# Patient Record
Sex: FEMALE | Race: WHITE | NOT HISPANIC OR LATINO | Employment: OTHER | URBAN - METROPOLITAN AREA
[De-identification: names, ages, dates, MRNs, and addresses within clinical notes are randomized per-mention and may not be internally consistent; named-entity substitution may affect disease eponyms.]

---

## 2023-11-03 ENCOUNTER — TELEPHONE (OUTPATIENT)
Dept: OTOLARYNGOLOGY | Facility: HOSPITAL | Age: 58
End: 2023-11-03
Payer: COMMERCIAL

## 2023-11-06 ENCOUNTER — NUTRITION (OUTPATIENT)
Dept: HEMATOLOGY/ONCOLOGY | Facility: HOSPITAL | Age: 58
End: 2023-11-06
Payer: COMMERCIAL
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
## 2023-12-05 ENCOUNTER — OFFICE VISIT (OUTPATIENT)
Dept: OTOLARYNGOLOGY | Facility: CLINIC | Age: 58
End: 2023-12-05
Payer: COMMERCIAL

## 2023-12-05 VITALS — TEMPERATURE: 97.8 F | BODY MASS INDEX: 20.99 KG/M2 | WEIGHT: 126 LBS | HEIGHT: 65 IN

## 2023-12-05 DIAGNOSIS — R13.12 OROPHARYNGEAL DYSPHAGIA: ICD-10-CM

## 2023-12-05 DIAGNOSIS — C32.9 MALIGNANT NEOPLASM OF LARYNX (MULTI): Primary | ICD-10-CM

## 2023-12-05 DIAGNOSIS — R49.1 APHONIA: ICD-10-CM

## 2023-12-05 PROCEDURE — 99214 OFFICE O/P EST MOD 30 MIN: CPT | Performed by: OTOLARYNGOLOGY

## 2023-12-05 PROCEDURE — 1036F TOBACCO NON-USER: CPT | Performed by: OTOLARYNGOLOGY

## 2023-12-05 PROCEDURE — 92511 NASOPHARYNGOSCOPY: CPT | Performed by: OTOLARYNGOLOGY

## 2023-12-05 RX ORDER — ACETAMINOPHEN 500 MG
TABLET ORAL EVERY 6 HOURS PRN
COMMUNITY
End: 2024-04-02 | Stop reason: SDUPTHER

## 2023-12-05 RX ORDER — ONDANSETRON 8 MG/1
8 TABLET, ORALLY DISINTEGRATING ORAL EVERY 8 HOURS PRN
COMMUNITY
End: 2024-04-02 | Stop reason: DRUGHIGH

## 2023-12-05 RX ORDER — CYCLOBENZAPRINE HCL 10 MG
10 TABLET ORAL 3 TIMES DAILY PRN
COMMUNITY

## 2023-12-05 RX ORDER — OXYCODONE HYDROCHLORIDE 5 MG/1
5 TABLET ORAL EVERY 6 HOURS PRN
COMMUNITY

## 2023-12-05 RX ORDER — POLYETHYLENE GLYCOL 3350 17 G/17G
17 POWDER, FOR SOLUTION ORAL DAILY
COMMUNITY

## 2023-12-05 RX ORDER — SENNOSIDES 8.6 MG/1
1 TABLET ORAL DAILY
COMMUNITY
End: 2024-05-14 | Stop reason: SDUPTHER

## 2023-12-05 RX ORDER — LANSOPRAZOLE 30 MG/1
30 CAPSULE, DELAYED RELEASE ORAL
COMMUNITY
End: 2024-04-02 | Stop reason: SDUPTHER

## 2023-12-05 ASSESSMENT — PATIENT HEALTH QUESTIONNAIRE - PHQ9
2. FEELING DOWN, DEPRESSED OR HOPELESS: NOT AT ALL
SUM OF ALL RESPONSES TO PHQ9 QUESTIONS 1 AND 2: 0
1. LITTLE INTEREST OR PLEASURE IN DOING THINGS: NOT AT ALL

## 2023-12-17 PROBLEM — R49.1 APHONIA: Status: ACTIVE | Noted: 2023-12-17

## 2023-12-17 PROBLEM — C32.9 MALIGNANT NEOPLASM OF LARYNX (MULTI): Status: ACTIVE | Noted: 2023-12-17

## 2023-12-17 PROBLEM — R13.12 OROPHARYNGEAL DYSPHAGIA: Status: ACTIVE | Noted: 2023-12-17

## 2023-12-18 ENCOUNTER — EVALUATION (OUTPATIENT)
Dept: SPEECH THERAPY | Facility: HOSPITAL | Age: 58
End: 2023-12-18
Payer: COMMERCIAL

## 2023-12-18 DIAGNOSIS — R13.12 OROPHARYNGEAL DYSPHAGIA: Primary | ICD-10-CM

## 2023-12-18 DIAGNOSIS — R49.1 APHONIA: ICD-10-CM

## 2023-12-18 DIAGNOSIS — C32.9 MALIGNANT NEOPLASM OF LARYNX (MULTI): ICD-10-CM

## 2023-12-18 PROCEDURE — 92607 EX FOR SPEECH DEVICE RX 1HR: CPT | Mod: GN | Performed by: SPEECH-LANGUAGE PATHOLOGIST

## 2023-12-18 ASSESSMENT — PAIN - FUNCTIONAL ASSESSMENT: PAIN_FUNCTIONAL_ASSESSMENT: 0-10

## 2023-12-18 ASSESSMENT — PAIN SCALES - GENERAL: PAINLEVEL_OUTOF10: 0 - NO PAIN

## 2023-12-18 NOTE — PROGRESS NOTES
Status post LP with  flap reconstruction for with post op XRT 2012      Overall she is doing okay    No longer smoking or utilizing drugs for the patient    She would like to proceed with TEP      So with some swallowing issues    Had posterior spinal fusion      Physical Exam:  CONSTITUTIONAL:  No acute distress  VOICE:  No hoarseness or other abnormality  RESPIRATION:  Breathing comfortably, no stridor  CV:  No clubbing/cyanosis/edema in hands  EYES:  EOM intact, sclera normal  NEURO:  Alert and oriented times 3, Cranial nerves II-XII grossly intact and symmetric bilaterally  HEAD AND FACE:  Symmetric facial features, no masses or lesions, sinuses non-tender to palpation  SALIVARY GLANDS:  Parotid and submandibular glands normal bilaterally  EARS:  Normal external ears, external auditory canals, and TMs to otoscopy, normal hearing to whispered voice.  NOSE:  External nose midline, anterior rhinoscopy is normal with limited visualization to the anterior aspect of the interior turbinates, no bleeding or drainage, no lesions  ORAL CAVITY/OROPHARYNX/LIPS:  Normal mucous membranes, normal floor of mouth/tongue/OP, no masses or lesions  PHARYNGEAL WALLS:  No masses or lesions  NECK/LYMPH: no masses status post radical neck dissection carotid palpable, no thyroid masses, stoma okay  SKIN:  Neck skin i healed scars anteriorly, posterior spinal fusion scar okay PSYCH:  Alert and oriented with appropriate mood and affect            After informed verbal consent and to better evaluate the reconstructed pharyngo-esophagus , the nasal cavity was instilled with topical decongestant and anesthesia after which flexible endoscopy was performed showing patency of the proximal and distal anastomosis. The flap was in good position, flap was visualized and no mucosal abnormalities were noted.  The patient tolerated procedure well and the scope was removed,  no obvious strictures noted           A/p    PASCUAL    We will refer to  laryngology for consideration of TEP as I think this will help the patient psychologically with communication    She understands importance of maintenance and remaining free of use of alcohol tobacco and illicit drugs      I will see her back in a years time

## 2023-12-20 NOTE — PROGRESS NOTES
Speech-Language Pathology    TEP Evaluation    Patient Name: Helen Miller  MRN: [id number]  Today's Date: 12/18/2023     Time Calculation  Start Time: 1345  Stop Time: 1430  Time Calculation (min): 45 min      Current Problem:  Patient Active Problem List   Diagnosis    Malignant neoplasm of larynx (CMS/HCC)    Oropharyngeal dysphagia    Aphonia       Voice Assessment:   Patient is a 58 to female presenting for consideration of repeat TEP puncture/placement for increased access to alaryngeal communication.     She is s/p TL 3/28/2012 for laryngeal CA with h/o XRT. Most recent episode of TEP care from 9/1/2016 - 4/2022. Team elected to close puncture 2/2 poor maintenance of device resulting in multiple complications and challenges. She would often arrive to visits mentally altered on drugs and alcohol. Here today reporting no smoking or drug use requesting consideration for TE speech.     Patient seen for pre-TEP  counseling to determine candidacy for procedure. Patient able to communicate using primary method of written communication. Spouse reporting reduced auditory comprehension with need to rely on lip reading cues/written communication for message comprehension. Manual dexterity good. Patient here with naked stoma - negative for LaryTube or HME use. States she has not worn these in several months with her stoma remaining stable. Advised patient that stoma checks to assess stoma stability over time to prevent development of microstoma. Discussed benefit of HME use and possible benefit from baseplate given stable stoma.      Receptive/Expressive language and cognitive skills WFL with no deficits identified. Use of vanity mirror with magnifier and light source suggested as method for managing TEP.     Discussed daily maintenance requirements with voice prosthesis with cleaning requirement of 2-3 x/day. Anecdotal methods for preserving lifespan of device discussed. Potential for leaking and dislodgement also reviewed  with need to replace device due to average lifespan of 2-3 months. Advised patient that quality of voice contingent on esophageal patency. Given no report of swallowing difficulty, suspect that alaryngeal voice post procedure will be adequate and deemed satisfactory for patient.      Patient expressing readiness to proceed with TEP placement and is a reasonable candidate. Emphasized need for consistent speech visits to maintain safety and good function of her prosthesis. Explained process for scheduling procedure and need for patient to discuss desire for TEP with ENT physician to assist with coordinating schedule to proceed.      TEP Plan of Care:  Therapy warranted: Yes  Frequency: x1/2-3 months or PRN  Duration: Indefinitely  TEP Recommendations: Assess condition of TEP, Measure length of TEP, Determine type and order voice prosthesis, Increase alaryngeal speech at the conversational level  Prognosis: Fair  Discussed Plan of Care: Patient, Caregiver/Family, Discussed risks/benefits with patient/caregiver, Patient/Caregiver agreeable with POC    Subjective   Current Problem: Patient is  59 yo female s/p TL here for consideration of repeat TEP placement. She is being followed by Dr. Pizano. Her most recent EOC with ST ended in April, 2022 with decision to close her puncture following series of poor TEP check/changes 2/2 patient's drug use and smoking. She now denies access to drugs or smoking and would like to consider repeat placement. She is frustrated with her current means of communication and would like access to less restrictive forms of alaryngeal speech.     General Visit Information:  Patient Class: Outpatient  Living Environment: Home  Arrival: Family/caregiver present  Caregiver Feedback: Partner expressed sadness regarding his recent cancer diagnosis.  Ordering Physician: Dr. Pizano  Reason for Referral: consideration for TEP placement  Referred By: Dr. Pizano  Past Medical History Relevant to Rehab:  Total Laryngectomy 3/28/2012 for laryngeal cancer. History of XRT.  Repuncture of TEP in 9/01/2016  Last voice prosthesis change 10/20/21 (In [facility] Dual Valve Large Flange 20Fr/10mm voice prosthesis) - puncture closed following period of poor care/maintenance  Patient Seen During This Visit: Yes    Objective     Pain Assessment:  Pain Assessment: 0-10  Pain Score: 0 - No pain    Voice Use Inventory:  Voice misuse/abuse: None  Exposure to Noise: No  Exposure to respiratory irritants: No  Consistent use of singing voice: No  Occupation relies on speaking voice: No    Patient Self Assessment:  Daily water intake: Yes  Daily caffeine intake: Yes  Alcohol intake: Yes  Smoking history: Yes    Voice Objective:  Motor Speech Production: WFL  Pitch: Other: (comment) (aphonic)  Voice Quality:  (aphonia)  Fluency: WFL      TEP Treatment:  Instruction/Information Provided: Cleaning implements, Cleaning prosthesis, Laryngectomy supplies/resources  Education type: Verbal education provided  Individuals educated: Patient, Spouse  Response to education: Verbalized understanding, Demonstrated understanding, Needs review  Patient/Caregiver verbalized understanding and agreement: Yes  Treatment Goals: Clinician will determine the appropiate size and type of  needed and successfully place a new , Patient will demonstrate good care, maintenance and use of voice prosthesis, Patient will produce intelligible TE speech with 80% accuracy, Patient will demonstrate adequate stoma management and participate in pulmonary rehabilitation

## 2024-01-29 ENCOUNTER — APPOINTMENT (OUTPATIENT)
Dept: PRIMARY CARE | Facility: CLINIC | Age: 59
End: 2024-01-29
Payer: COMMERCIAL

## 2024-02-01 DIAGNOSIS — F41.9 ANXIETY AND DEPRESSION: ICD-10-CM

## 2024-02-01 DIAGNOSIS — C32.9 MALIGNANT NEOPLASM OF LARYNX (MULTI): ICD-10-CM

## 2024-02-01 DIAGNOSIS — F32.A ANXIETY AND DEPRESSION: ICD-10-CM

## 2024-02-01 DIAGNOSIS — Z93.0 TRACHEOSTOMY IN PLACE (MULTI): Primary | ICD-10-CM

## 2024-02-02 RX ORDER — HYDROXYZINE PAMOATE 25 MG/1
CAPSULE ORAL
Qty: 90 CAPSULE | Refills: 0 | OUTPATIENT
Start: 2024-02-02

## 2024-02-02 RX ORDER — POLYETHYLENE GLYCOL 3350 17 G/17G
POWDER, FOR SOLUTION ORAL
Qty: 30 PACKET | OUTPATIENT
Start: 2024-02-02

## 2024-02-02 RX ORDER — SODIUM CHLORIDE 0.9 G/100ML
IRRIGANT IRRIGATION
Qty: 1000 ML | Refills: 11 | Status: SHIPPED | OUTPATIENT
Start: 2024-02-02

## 2024-02-12 ENCOUNTER — APPOINTMENT (OUTPATIENT)
Dept: OTOLARYNGOLOGY | Facility: HOSPITAL | Age: 59
End: 2024-02-12
Payer: COMMERCIAL

## 2024-02-25 NOTE — PROGRESS NOTES
ASSESSMENT AND PLAN:   Helen Miller is a 58 y.o. female with history of total laryngectomy and flap reconstruction in 2012 and previous tracheoesophageal puncture desires a new TEP. We performed esophagoscopy with evaluation of the esophagus and noted a fungal infection. During attempt at tracheoesophageal puncture placement, the patient had some coughing and was noted to have thick mucoid secretions concerning for a bronchitis/tracheitis. Will obtain CXR today and order antibiotics and antifungals for the patient.    The patient will follow up with me in the future for repeat attempt at TEP. The patient would also benefit Larissa Tube in the interim.       Reason For Consult  No chief complaint on file.    HISTORY OF PRESENT ILLNESS:  Helen Miller is a 58 y.o. female with a history of total laryngectomy and flap reconstruction in May 2012. She has had significant esophageal issues with dilations in the past by Dr. Pizano. Dr. Shukla saw the patient in 2022 and noted that she was not taking care of her TEP and it was removed. The patient is here today at the request of Dr. Pizano for a TEP replacement. The patient and I discussed the benefits, risks, alternatives of the procedure.     Past Medical History  She has a past medical history of Anxiety disorder, unspecified (02/06/2014), Aphonia (12/03/2022), Neuralgia and neuritis, unspecified (02/06/2014), Other conditions influencing health status, Personal history of other diseases of the digestive system (02/06/2014), Personal history of other medical treatment, and Unspecified asthma, uncomplicated (05/07/2019). Surgical History  She has a past surgical history that includes Other surgical history (10/15/2013); Tubal ligation (09/25/2018); Appendectomy (09/25/2018); and Other surgical history (02/06/2014).   Social History  She reports that she has been smoking cigarettes. She has been smoking an average of .5 packs per day. She has never used smokeless tobacco. No  history on file for alcohol use and drug use. Allergies  Ketorolac, Metronidazole, Oxymorphone, Propoxyphene, Propoxyphene n-acetaminophen, Tramadol, Penicillins, and Prednisone     Family History  No family history on file.     Review of Systems  All 10 systems were reviewed and negative except for above.      Physical Exam    ENT Physical Exam  Constitutional  Appearance: patient appears well-developed and well-nourished,  Head and Face  Appearance: head appears normal and face appears normal;  Ear  Auricles: right auricle normal; left auricle normal;  Nose  External Nose: nares patent bilaterally;  Oral Cavity/Oropharynx  Lips: normal;  Neck  Neck: neck normal; neck palpation normal;  Respiratory  Inspection: no retractions visible;  Cardiovascular  Inspection: no peripheral edema present;  Neurovestibular  Mental Status: alert and oriented;  Psychiatric: mood normal;  Cranial Nerves: cranial nerves intact;        Last Recorded Vitals  There were no vitals taken for this visit.    Relevant Results       Patient Reported Outcome Measures         Radiology, Laboratory and Pathology  No results found.      ----------------------------------------------------------------------  Procedures   Procedure:  1. Tracheoesophageal Puncture (TEP)   2. Flexible transnasal esophagoscopy      Diagnosis: Aphonia s/p total laryngectomy, dysphagia, history of laryngeal cancer      Description of Procedure:   Informed consent was obtained and signed. We discussed the risks, benefits, and alternatives to this procedure, primarily electrolarynx usage. We discussed the risks and benefits of intervention to include but not be limited to, bleeding and infection, damage to surrounding structures including muscles, nerves, and blood vessels, as well as scarring. We further discussed the possibility of persistent aphonia, swallowing difficulty, breathing difficulty, medical complications, and risks of anesthesia.       After appropriate  Afrin and lidocaine spray, I advanced the transnasal esophagoscope. I advanced into the esophagus, no lesions or masses were appreciated. Under direct visualization of the anterior esophageal wall, I advanced a 25-gauge needle via the stoma. Next, an 11 blade was advanced fbut after partial incision, the patient coughed and we were forced to abort the rest of procedure.         Time Spent  Prep time on day of patient encounter: 5-10 minutes  Time spent directly with patient, family or caregiver: 25 minutes  Additional Time Spent on Patient Care Activities/discuss care plan with SLP: 5 minutes  Documentation Time: 10 minutes  Other Time Spent: 0 minutes  Total: 50 minutes    Scribe Attestation  By signing my name below, I, Raul Trinh, attest that this documentation has been prepared under the direction and in the presence of Carlitos Gomez MD.

## 2024-02-26 ENCOUNTER — HOSPITAL ENCOUNTER (OUTPATIENT)
Dept: RADIOLOGY | Facility: HOSPITAL | Age: 59
Discharge: HOME | End: 2024-02-26
Payer: COMMERCIAL

## 2024-02-26 ENCOUNTER — PROCEDURE VISIT (OUTPATIENT)
Dept: OTOLARYNGOLOGY | Facility: HOSPITAL | Age: 59
End: 2024-02-26
Payer: COMMERCIAL

## 2024-02-26 DIAGNOSIS — J40 BRONCHITIS: ICD-10-CM

## 2024-02-26 DIAGNOSIS — R49.1 VOICE ABSENCE: Primary | ICD-10-CM

## 2024-02-26 DIAGNOSIS — Z85.21 HX OF LARYNGEAL CANCER: ICD-10-CM

## 2024-02-26 PROCEDURE — 71046 X-RAY EXAM CHEST 2 VIEWS: CPT

## 2024-02-26 PROCEDURE — 99214 OFFICE O/P EST MOD 30 MIN: CPT | Performed by: OTOLARYNGOLOGY

## 2024-02-26 PROCEDURE — 71046 X-RAY EXAM CHEST 2 VIEWS: CPT | Performed by: RADIOLOGY

## 2024-02-26 RX ORDER — CLINDAMYCIN HYDROCHLORIDE 150 MG/1
150 CAPSULE ORAL EVERY 8 HOURS
Qty: 42 CAPSULE | Refills: 0 | Status: SHIPPED | OUTPATIENT
Start: 2024-02-26 | End: 2024-03-11

## 2024-02-26 RX ORDER — CLOTRIMAZOLE 10 MG/1
10 LOZENGE ORAL; TOPICAL
Qty: 70 TROCHE | Refills: 0 | Status: SHIPPED | OUTPATIENT
Start: 2024-02-26 | End: 2024-03-11

## 2024-02-26 ASSESSMENT — PATIENT HEALTH QUESTIONNAIRE - PHQ9
1. LITTLE INTEREST OR PLEASURE IN DOING THINGS: NOT AT ALL
SUM OF ALL RESPONSES TO PHQ9 QUESTIONS 1 & 2: 0
2. FEELING DOWN, DEPRESSED OR HOPELESS: NOT AT ALL

## 2024-02-27 RX ORDER — LIDOCAINE 40 MG/G
CREAM TOPICAL DAILY
Qty: 30 G | OUTPATIENT
Start: 2024-02-27

## 2024-03-01 DIAGNOSIS — J40 BRONCHITIS: ICD-10-CM

## 2024-03-18 ENCOUNTER — TELEPHONE (OUTPATIENT)
Dept: PRIMARY CARE | Facility: CLINIC | Age: 59
End: 2024-03-18
Payer: COMMERCIAL

## 2024-03-18 DIAGNOSIS — E53.8 B12 DEFICIENCY: ICD-10-CM

## 2024-03-18 DIAGNOSIS — F41.9 ANXIETY DISORDER, UNSPECIFIED TYPE: ICD-10-CM

## 2024-03-18 DIAGNOSIS — E03.9 ADULT HYPOTHYROIDISM: ICD-10-CM

## 2024-03-18 RX ORDER — LORAZEPAM 0.5 MG/1
0.5 TABLET ORAL DAILY PRN
Qty: 90 TABLET | Refills: 0 | OUTPATIENT
Start: 2024-03-18

## 2024-03-18 RX ORDER — LEVOTHYROXINE SODIUM 125 UG/1
125 TABLET ORAL DAILY
Qty: 90 TABLET | Refills: 0 | OUTPATIENT
Start: 2024-03-18

## 2024-03-18 RX ORDER — ONDANSETRON 8 MG/1
8 TABLET, ORALLY DISINTEGRATING ORAL EVERY 8 HOURS PRN
Qty: 20 TABLET | OUTPATIENT
Start: 2024-03-18

## 2024-03-19 RX ORDER — LEVOTHYROXINE SODIUM 125 UG/1
125 TABLET ORAL DAILY
Qty: 90 TABLET | Refills: 0 | OUTPATIENT
Start: 2024-03-19

## 2024-03-19 RX ORDER — LANOLIN ALCOHOL/MO/W.PET/CERES
1000 CREAM (GRAM) TOPICAL
Qty: 90 TABLET | Refills: 0 | OUTPATIENT
Start: 2024-03-19

## 2024-04-01 DIAGNOSIS — F32.A ANXIETY AND DEPRESSION: ICD-10-CM

## 2024-04-01 DIAGNOSIS — F41.9 ANXIETY AND DEPRESSION: ICD-10-CM

## 2024-04-02 ENCOUNTER — OFFICE VISIT (OUTPATIENT)
Dept: PRIMARY CARE | Facility: CLINIC | Age: 59
End: 2024-04-02
Payer: COMMERCIAL

## 2024-04-02 VITALS
BODY MASS INDEX: 21.36 KG/M2 | HEART RATE: 78 BPM | DIASTOLIC BLOOD PRESSURE: 68 MMHG | OXYGEN SATURATION: 92 % | SYSTOLIC BLOOD PRESSURE: 118 MMHG | TEMPERATURE: 96.9 F | WEIGHT: 128.2 LBS | HEIGHT: 65 IN

## 2024-04-02 DIAGNOSIS — E03.9 ADULT HYPOTHYROIDISM: Primary | ICD-10-CM

## 2024-04-02 DIAGNOSIS — K21.9 REFLUX LARYNGITIS: ICD-10-CM

## 2024-04-02 DIAGNOSIS — F41.9 ANXIETY DISORDER, UNSPECIFIED TYPE: ICD-10-CM

## 2024-04-02 DIAGNOSIS — J44.9 CHRONIC OBSTRUCTIVE PULMONARY DISEASE, UNSPECIFIED COPD TYPE (MULTI): ICD-10-CM

## 2024-04-02 DIAGNOSIS — E55.9 VITAMIN D DEFICIENCY: ICD-10-CM

## 2024-04-02 DIAGNOSIS — M47.812 OSTEOARTHRITIS OF CERVICAL SPINE, UNSPECIFIED SPINAL OSTEOARTHRITIS COMPLICATION STATUS: ICD-10-CM

## 2024-04-02 DIAGNOSIS — Z85.21 PERSONAL HISTORY OF MALIGNANT NEOPLASM OF LARYNX: ICD-10-CM

## 2024-04-02 DIAGNOSIS — N95.9 MENOPAUSAL PROBLEM: ICD-10-CM

## 2024-04-02 DIAGNOSIS — J04.0 REFLUX LARYNGITIS: ICD-10-CM

## 2024-04-02 DIAGNOSIS — E53.8 B12 DEFICIENCY: ICD-10-CM

## 2024-04-02 PROCEDURE — 99396 PREV VISIT EST AGE 40-64: CPT | Performed by: INTERNAL MEDICINE

## 2024-04-02 PROCEDURE — 99214 OFFICE O/P EST MOD 30 MIN: CPT | Performed by: INTERNAL MEDICINE

## 2024-04-02 RX ORDER — LORAZEPAM 0.5 MG/1
0.5 TABLET ORAL DAILY PRN
Qty: 30 TABLET | Refills: 0 | Status: SHIPPED | OUTPATIENT
Start: 2024-04-02

## 2024-04-02 RX ORDER — ONDANSETRON 4 MG/1
4 TABLET, ORALLY DISINTEGRATING ORAL EVERY 8 HOURS PRN
Qty: 30 TABLET | Refills: 1 | Status: SHIPPED | OUTPATIENT
Start: 2024-04-02

## 2024-04-02 RX ORDER — ACETAMINOPHEN 500 MG
500 TABLET ORAL EVERY 6 HOURS PRN
Qty: 90 TABLET | Refills: 3 | Status: SHIPPED | OUTPATIENT
Start: 2024-04-02

## 2024-04-02 RX ORDER — CHOLECALCIFEROL (VITAMIN D3) 25 MCG
1000 TABLET ORAL
Qty: 90 TABLET | Refills: 0 | Status: SHIPPED | OUTPATIENT
Start: 2024-04-02

## 2024-04-02 RX ORDER — LANOLIN ALCOHOL/MO/W.PET/CERES
1000 CREAM (GRAM) TOPICAL
Qty: 90 TABLET | Refills: 0 | Status: SHIPPED | OUTPATIENT
Start: 2024-04-02

## 2024-04-02 RX ORDER — ALBUTEROL SULFATE 90 UG/1
2 AEROSOL, METERED RESPIRATORY (INHALATION) EVERY 4 HOURS PRN
Qty: 18 G | Refills: 0 | Status: SHIPPED | OUTPATIENT
Start: 2024-04-02

## 2024-04-02 RX ORDER — HYDROXYZINE PAMOATE 25 MG/1
CAPSULE ORAL
Qty: 90 CAPSULE | Refills: 0 | OUTPATIENT
Start: 2024-04-02

## 2024-04-02 RX ORDER — LANSOPRAZOLE 30 MG/1
30 CAPSULE, DELAYED RELEASE ORAL
Qty: 90 CAPSULE | Refills: 0 | Status: SHIPPED | OUTPATIENT
Start: 2024-04-02 | End: 2024-05-14 | Stop reason: SDUPTHER

## 2024-04-02 ASSESSMENT — ENCOUNTER SYMPTOMS
FREQUENCY: 0
ENDOCRINE NEGATIVE: 1
NUMBNESS: 0
WEAKNESS: 0
FEVER: 0
DIZZINESS: 0
CONSTIPATION: 0
ADENOPATHY: 0
SHORTNESS OF BREATH: 0
ABDOMINAL PAIN: 0
ARTHRALGIAS: 0
PALPITATIONS: 0
HEADACHES: 0
COUGH: 1
FATIGUE: 0
EYE REDNESS: 0
ALLERGIC/IMMUNOLOGIC NEGATIVE: 1
JOINT SWELLING: 0
ACTIVITY CHANGE: 0
AGITATION: 0
BACK PAIN: 0
BLOOD IN STOOL: 0
DYSURIA: 0
WHEEZING: 0
NERVOUS/ANXIOUS: 1

## 2024-04-02 NOTE — PROGRESS NOTES
Subjective   Patient ID: Helen Miller is a 59 y.o. female who presents for Med Refill. And wellness exam  Lost for followups   Med Refill  Associated symptoms include coughing. Pertinent negatives include no abdominal pain, arthralgias, chest pain, congestion, fatigue, fever, headaches, joint swelling, numbness, rash or weakness.      Recent ED visit for FB removal on 3/22/24 , placed on Levaquin after bronchoscopy and removal of FB. She cannot speak, but write on a notebook to communicate.  She goes to pain management, takes gabapentin, and flexeril per OARRS review  PMH:  Immun/Inj. Record:  [id number] Pneumococcal conjugate vaccine, 20 valent (PCV20) 01/26/23  Problem List: Dysphagia, Malignant tumor of glottis, Esophageal dysmotility, Loss of voice, Squamous cell carcinoma of larynx, Chronic obstructive lung disease, Mediastinal lymphadenopathy, Cervical lymphadenopathy, Pleural effusion, Pneumonia, Asthma, History of laryngectomy, Foreign body in esophagus, Bipolar disorder, Anxiety, Candidiasis, Tear film insufficiency, Hypothyroidism, Patient encounter status, Lump on face, Drug-induced constipation, Nausea and vomiting, Hyposecretion of salivary gland, Nicotine dependence, Acute upper respiratory infection, Arthritis, Ulcerative colitis, H/O: Disorder  Health Maintenance:    Mammogram - (2015)  Pap - (2009)  Flu Inj - (2019)  Tdap Inj - (2020)  Pneum Inj - 01/26/23 , Prevnar 20  Medical Problems:  Throat Cancer - squamous cell carcinoma of larynx V10.21  Breast Cancer - Right , s/p lumpectomy  Cervical Cancer, COPD, Polyneuropathy  Dysphagia - Esophageal stricture s/p dilation  GERD, Hypothyroidism, Pneumonia  pleural effusion - Empyema , s/p chest tube , right  Chronic hypoxic respiratory failure  Surgical Hx:  Surgery Date: 9/20/2023 , Dr Heri Grove  Surgery Type: C1-C2 posterior spinal fusion, C1-C2 posterior spinal instrumentation, Open reduction displaced odontoid fracture  Preoperative symptoms:  Displaced C2 odontoid fracture, C1 arch fracture    Throat Surgery - status post salvage laryngopharyngectomy and flap  reconstruction 2012  Tubal Ligation, partial hysterectomy, Breast Surgery, Appendectomy, Cervical Surgery, Right Leg Surgery, Left Arm Surgery, Neck Surgery   History of Appendectomy     History of Pharyngolaryngectomy, Radical Neck Dissect., Reconstruction     History of Tracheostomy Construct TE Fistula, Insert Speech Prosthesis     History of Tubal Ligation    FH:  Father: due to Lung Cancer;   Throat Cancer - AGE 71.  Mother:  Diabetes, Hypertension, Alzheimer's Disease, Hyperlipidemia.  Siblings: 6.    SH:  Marital: .Pets: 2 dogs.Occupation: Disabled.Sleep: Typically sleeps 10 hours a night.  Personal Habits:  Cigarette Use: Light tobacco smoker (10 or fewer cigarettes/day).Alcohol: Denies alcohol use.Daily Caffeine: Consumes on average 4 cups of coffee per day.Exercise Type: Exercises regularly.        Review of Systems   Constitutional:  Negative for activity change, fatigue and fever.   HENT:  Negative for congestion.    Eyes:  Negative for redness and visual disturbance.   Respiratory:  Positive for cough. Negative for shortness of breath and wheezing.         Recent pneumonia, finished her ATB Levaquin   Cardiovascular:  Negative for chest pain, palpitations and leg swelling.   Gastrointestinal:  Negative for abdominal pain, blood in stool and constipation.   Endocrine: Negative.    Genitourinary:  Negative for dysuria, frequency and urgency.   Musculoskeletal:  Negative for arthralgias, back pain and joint swelling.   Skin:  Negative for rash.   Allergic/Immunologic: Negative.    Neurological:  Negative for dizziness, weakness, numbness and headaches.   Hematological:  Negative for adenopathy.   Psychiatric/Behavioral:  Negative for agitation and behavioral problems. The patient is nervous/anxious.          Physical Exam  Constitutional:       Appearance: Normal  appearance.   HENT:      Head: Normocephalic and atraumatic.      Right Ear: Tympanic membrane and external ear normal.      Left Ear: Tympanic membrane and external ear normal.      Nose: No congestion.      Mouth/Throat:      Mouth: Mucous membranes are moist.      Pharynx: Oropharynx is clear.   Eyes:      Extraocular Movements: Extraocular movements intact.      Conjunctiva/sclera: Conjunctivae normal.      Pupils: Pupils are equal, round, and reactive to light.   Neck:      Comments: Trach in place, she is nonverbal  Cardiovascular:      Rate and Rhythm: Normal rate and regular rhythm.      Pulses: Normal pulses.      Heart sounds: Normal heart sounds. No murmur heard.  Pulmonary:      Effort: Pulmonary effort is normal.      Breath sounds: Normal breath sounds. No wheezing or rales.   Abdominal:      General: Abdomen is flat. Bowel sounds are normal.      Palpations: Abdomen is soft.      Hernia: No hernia is present.   Musculoskeletal:         General: No swelling or tenderness. Normal range of motion.      Cervical back: Normal range of motion and neck supple.      Right lower leg: No edema.      Left lower leg: No edema.   Skin:     General: Skin is warm and dry.      Capillary Refill: Capillary refill takes less than 2 seconds.      Findings: No rash.   Neurological:      General: No focal deficit present.      Mental Status: She is alert and oriented to person, place, and time.      Motor: No weakness.      Gait: Gait normal.   Psychiatric:         Mood and Affect: Mood normal.         Behavior: Behavior normal.         Assessment/Plan     Cervical spine DJD :  s/p C spine surgery recently at [facility]  Surgical followup , pain control    H/o CA of larynx :  she cannot verbalize  Trach opening care, suctions prn  pulmonary hygiene   ENT and surgery followup  Recent FB removal , Zbronchoscopy    COPD:   No smoking , she has quit  use inhalers as directed  rec. Pulmonary followup     Hypothyroid :  Check  TFT  Levothyroxine    General anxiety:  Her daughter , and mom passed away  Multiple and complex health issues   Refill lorazepam , OARRS reviewed   Reviewed Controlled Substance Agreement including but not limited to the benefits, risk, and alternatives to treatment with a Controlled Substance medication(s)              : I have personally reviewed the OARRS  report on this patient.   I have considered the risks of abuse dependence addiction and diversion.             I believe it is clinically appropriate for this patient to be prescribed the medications    Vision care  Flushot in Fall  She had pnuemovax last year  Covid booster , Shingrix at pharmacy  Mammogram yearly  Colonoscopy, she is reluctant  Check labs , Paper Order provided

## 2024-04-25 ENCOUNTER — TELEPHONE (OUTPATIENT)
Dept: OTOLARYNGOLOGY | Facility: HOSPITAL | Age: 59
End: 2024-04-25
Payer: COMMERCIAL

## 2024-04-25 DIAGNOSIS — R49.1 APHONIA: ICD-10-CM

## 2024-04-25 DIAGNOSIS — C32.9 MALIGNANT NEOPLASM OF LARYNX (MULTI): Primary | ICD-10-CM

## 2024-04-25 NOTE — TELEPHONE ENCOUNTER
Eliane left a message stating Helen needs a script for a new suction machine and supplies.  (No other information given).

## 2024-04-26 ENCOUNTER — TELEPHONE (OUTPATIENT)
Dept: OTOLARYNGOLOGY | Facility: HOSPITAL | Age: 59
End: 2024-04-26
Payer: COMMERCIAL

## 2024-04-26 DIAGNOSIS — R49.1 APHONIA: ICD-10-CM

## 2024-04-26 DIAGNOSIS — C32.9 MALIGNANT NEOPLASM OF LARYNX (MULTI): ICD-10-CM

## 2024-04-26 NOTE — TELEPHONE ENCOUNTER
Spoke to Eliane.  She is aware [facility] homecare is currently working on the order.

## 2024-04-26 NOTE — TELEPHONE ENCOUNTER
Eliane called to say the script sent over on yesterday was not signed by RR.  Also, apparently, the note you sent does not indicate her suction machine is broken.  Can you call Eliane

## 2024-05-02 DIAGNOSIS — E03.9 ADULT HYPOTHYROIDISM: ICD-10-CM

## 2024-05-02 DIAGNOSIS — E03.9 ADULT HYPOTHYROIDISM: Primary | ICD-10-CM

## 2024-05-02 DIAGNOSIS — E78.00 PURE HYPERCHOLESTEROLEMIA: Primary | ICD-10-CM

## 2024-05-02 RX ORDER — ROSUVASTATIN CALCIUM 20 MG/1
20 TABLET, COATED ORAL DAILY
Qty: 90 TABLET | Refills: 1 | Status: SHIPPED | OUTPATIENT
Start: 2024-05-02

## 2024-05-02 RX ORDER — LEVOTHYROXINE SODIUM 150 UG/1
150 TABLET ORAL DAILY
Qty: 90 TABLET | Refills: 1 | Status: SHIPPED | OUTPATIENT
Start: 2024-05-02 | End: 2024-05-14 | Stop reason: SDUPTHER

## 2024-05-02 RX ORDER — LEVOTHYROXINE SODIUM 125 UG/1
125 TABLET ORAL DAILY
Qty: 90 TABLET | Refills: 0 | Status: CANCELLED | OUTPATIENT
Start: 2024-05-02

## 2024-05-02 NOTE — TELEPHONE ENCOUNTER
Most Recent  Thyroxine, Free: 0.91 9/12/22 10:09  Thyroid Stimulating Hormone: 0.06 (L) 9/12/22 10:09    I need latest thyroid labs please

## 2024-05-13 ENCOUNTER — TELEPHONE (OUTPATIENT)
Dept: PRIMARY CARE | Facility: CLINIC | Age: 59
End: 2024-05-13
Payer: COMMERCIAL

## 2024-05-13 NOTE — TELEPHONE ENCOUNTER
Request for medication refill/ Discuss medications reviewed chart Simvastatin and Crestor have been sent and confirmed by pharmacy 5/2 with refills available. Returned call to explain and reviewed chart with patient's son Jesse. Scheduled OV to further discuss with PCP.  Scheduled patient for 5/14 at 3:20pm

## 2024-05-14 ENCOUNTER — APPOINTMENT (OUTPATIENT)
Dept: PRIMARY CARE | Facility: CLINIC | Age: 59
End: 2024-05-14
Payer: COMMERCIAL

## 2024-05-14 ENCOUNTER — OFFICE VISIT (OUTPATIENT)
Dept: PRIMARY CARE | Facility: CLINIC | Age: 59
End: 2024-05-14
Payer: COMMERCIAL

## 2024-05-14 VITALS
HEIGHT: 65 IN | WEIGHT: 124 LBS | TEMPERATURE: 97.4 F | SYSTOLIC BLOOD PRESSURE: 120 MMHG | DIASTOLIC BLOOD PRESSURE: 62 MMHG | BODY MASS INDEX: 20.66 KG/M2

## 2024-05-14 DIAGNOSIS — F41.9 ANXIETY DISORDER, UNSPECIFIED TYPE: ICD-10-CM

## 2024-05-14 DIAGNOSIS — F32.A ANXIETY AND DEPRESSION: ICD-10-CM

## 2024-05-14 DIAGNOSIS — G62.9 NEUROPATHY: ICD-10-CM

## 2024-05-14 DIAGNOSIS — K59.00 CONSTIPATION, UNSPECIFIED CONSTIPATION TYPE: Primary | ICD-10-CM

## 2024-05-14 DIAGNOSIS — J04.0 REFLUX LARYNGITIS: ICD-10-CM

## 2024-05-14 DIAGNOSIS — F41.9 ANXIETY AND DEPRESSION: ICD-10-CM

## 2024-05-14 DIAGNOSIS — E03.9 ADULT HYPOTHYROIDISM: ICD-10-CM

## 2024-05-14 DIAGNOSIS — K21.9 REFLUX LARYNGITIS: ICD-10-CM

## 2024-05-14 PROCEDURE — 99214 OFFICE O/P EST MOD 30 MIN: CPT | Performed by: INTERNAL MEDICINE

## 2024-05-14 RX ORDER — HYDROXYZINE PAMOATE 25 MG/1
CAPSULE ORAL
Qty: 90 CAPSULE | Refills: 0 | Status: SHIPPED | OUTPATIENT
Start: 2024-05-14

## 2024-05-14 RX ORDER — LANSOPRAZOLE 30 MG/1
30 CAPSULE, DELAYED RELEASE ORAL
Qty: 90 CAPSULE | Refills: 0 | Status: SHIPPED | OUTPATIENT
Start: 2024-05-14

## 2024-05-14 RX ORDER — GABAPENTIN 300 MG/1
300 CAPSULE ORAL 2 TIMES DAILY
Qty: 60 CAPSULE | Refills: 0 | Status: SHIPPED | OUTPATIENT
Start: 2024-05-14 | End: 2024-05-14 | Stop reason: ENTERED-IN-ERROR

## 2024-05-14 RX ORDER — LEVOTHYROXINE SODIUM 150 UG/1
150 TABLET ORAL DAILY
Qty: 90 TABLET | Refills: 1 | Status: SHIPPED | OUTPATIENT
Start: 2024-05-14

## 2024-05-14 RX ORDER — SENNOSIDES 8.6 MG/1
1 TABLET ORAL DAILY
Qty: 100 TABLET | Refills: 1 | Status: SHIPPED | OUTPATIENT
Start: 2024-05-14

## 2024-05-14 ASSESSMENT — ENCOUNTER SYMPTOMS
WHEEZING: 0
PALPITATIONS: 0
EYE REDNESS: 0
ADENOPATHY: 0
BLOOD IN STOOL: 0
SHORTNESS OF BREATH: 0
CONSTIPATION: 0
FREQUENCY: 0
NERVOUS/ANXIOUS: 1
DYSURIA: 0
ALLERGIC/IMMUNOLOGIC NEGATIVE: 1
DIZZINESS: 0
BACK PAIN: 0
AGITATION: 0
ACTIVITY CHANGE: 0
ENDOCRINE NEGATIVE: 1

## 2024-05-14 NOTE — PROGRESS NOTES
Subjective   Patient ID: Helen Miller is a 59 y.o. female who presents for Medication Visit (Review of medications). And wellness exam  Lost for followups and  problem with getting her meds at pharmacy  HPI   Recent ED visit for FB removal on 3/22/24 , placed on Levaquin after bronchoscopy and removal of FB. She cannot speak, but write on a notebook to communicate.  She goes to pain management, takes gabapentin, and flexeril per OARRS review  PMH:  Immun/Inj. Record:  [id number] Pneumococcal conjugate vaccine, 20 valent (PCV20) 01/26/23  Problem List: Dysphagia, Malignant tumor of glottis, Esophageal dysmotility, Loss of voice, Squamous cell carcinoma of larynx, Chronic obstructive lung disease, Mediastinal lymphadenopathy, Cervical lymphadenopathy, Pleural effusion, Pneumonia, Asthma, History of laryngectomy, Foreign body in esophagus, Bipolar disorder, Anxiety, Candidiasis, Tear film insufficiency, Hypothyroidism, Patient encounter status, Lump on face, Drug-induced constipation, Nausea and vomiting, Hyposecretion of salivary gland, Nicotine dependence, Acute upper respiratory infection, Arthritis, Ulcerative colitis, H/O: Disorder  Health Maintenance:    Mammogram - (2015)  Pap - (2009)  Flu Inj - (2019)  Tdap Inj - (2020)  Pneum Inj - 01/26/23 , Prevnar 20  Medical Problems:  Throat Cancer - squamous cell carcinoma of larynx V10.21  Breast Cancer - Right , s/p lumpectomy  Cervical Cancer, COPD, Polyneuropathy  Dysphagia - Esophageal stricture s/p dilation  GERD, Hypothyroidism, Pneumonia  pleural effusion - Empyema , s/p chest tube , right  Chronic hypoxic respiratory failure  Surgical Hx:  Surgery Date: 9/20/2023 , Dr Heri Grove  Surgery Type: C1-C2 posterior spinal fusion, C1-C2 posterior spinal instrumentation, Open reduction displaced odontoid fracture  Preoperative symptoms: Displaced C2 odontoid fracture, C1 arch fracture    Throat Surgery - status post salvage laryngopharyngectomy and  flap  reconstruction 2012  Tubal Ligation, partial hysterectomy, Breast Surgery, Appendectomy, Cervical Surgery, Right Leg Surgery, Left Arm Surgery, Neck Surgery   History of Appendectomy     History of Pharyngolaryngectomy, Radical Neck Dissect., Reconstruction     History of Tracheostomy Construct TE Fistula, Insert Speech Prosthesis     History of Tubal Ligation    FH:  Father: due to Lung Cancer;   Throat Cancer - AGE 71.  Mother:  Diabetes, Hypertension, Alzheimer's Disease, Hyperlipidemia.  Siblings: 6.    SH:  Marital: .Pets: 2 dogs.Occupation: Disabled.Sleep: Typically sleeps 10 hours a night.  Personal Habits:  Cigarette Use: Light tobacco smoker (10 or fewer cigarettes/day).Alcohol: Denies alcohol use.Daily Caffeine: Consumes on average 4 cups of coffee per day.Exercise Type: Exercises regularly.        Review of Systems   Constitutional:  Negative for activity change.   Eyes:  Negative for redness and visual disturbance.   Respiratory:  Negative for shortness of breath and wheezing.         Recent pneumonia, finished her ATB Levaquin   Cardiovascular:  Negative for palpitations and leg swelling.   Gastrointestinal:  Negative for blood in stool and constipation.   Endocrine: Negative.    Genitourinary:  Negative for dysuria, frequency and urgency.   Musculoskeletal:  Negative for back pain.   Allergic/Immunologic: Negative.    Neurological:  Negative for dizziness.   Hematological:  Negative for adenopathy.   Psychiatric/Behavioral:  Negative for agitation and behavioral problems. The patient is nervous/anxious.          Physical Exam  Constitutional:       Appearance: Normal appearance.   HENT:      Head: Normocephalic and atraumatic.      Right Ear: Tympanic membrane and external ear normal.      Left Ear: Tympanic membrane and external ear normal.      Nose: No congestion.      Mouth/Throat:      Mouth: Mucous membranes are moist.      Pharynx: Oropharynx is clear.   Eyes:       Extraocular Movements: Extraocular movements intact.      Conjunctiva/sclera: Conjunctivae normal.      Pupils: Pupils are equal, round, and reactive to light.   Neck:      Comments: Trach in place, she is nonverbal  Cardiovascular:      Rate and Rhythm: Normal rate and regular rhythm.      Pulses: Normal pulses.      Heart sounds: Normal heart sounds. No murmur heard.  Pulmonary:      Effort: Pulmonary effort is normal.      Breath sounds: Normal breath sounds. No wheezing or rales.   Abdominal:      General: Abdomen is flat. Bowel sounds are normal.      Palpations: Abdomen is soft.      Hernia: No hernia is present.   Musculoskeletal:         General: No swelling or tenderness. Normal range of motion.      Cervical back: Normal range of motion and neck supple.      Right lower leg: No edema.      Left lower leg: No edema.   Skin:     General: Skin is warm and dry.      Capillary Refill: Capillary refill takes less than 2 seconds.      Findings: No rash.   Neurological:      General: No focal deficit present.      Mental Status: She is alert and oriented to person, place, and time.      Motor: No weakness.      Gait: Gait normal.   Psychiatric:         Mood and Affect: Mood normal.         Behavior: Behavior normal.         Assessment/Plan     Cervical spine DJD :  s/p C spine surgery [facility]  Surgical followup , pain control    H/o CA of larynx :  she cannot verbalize  Trach opening care, suctions prn  pulmonary hygiene   ENT and surgery followup  Recent FB removal , Zbronchoscopy    COPD:   No smoking , she has quit  use inhalers as directed  rec. Pulmonary followup     Hypothyroid :  Check TFT , TSH was 67.243   Levothyroxine 150 mcg daily   Recheck in 12 weeks    General anxiety:  Her daughter , and mom passed away  Multiple and complex health issues   Refill lorazepam , OARRS reviewed   Reviewed Controlled Substance Agreement including but not limited to the benefits, risk, and alternatives to  treatment with a Controlled Substance medication(s)              : I have personally reviewed the OARRS  report on this patient.   I have considered the risks of abuse dependence addiction and diversion.             I believe it is clinically appropriate for this patient to be prescribed the medications    Vision care  Flushot in Fall  She had pnuemovax last year  Covid booster , Shingrix at pharmacy  Mammogram yearly  Colonoscopy, she is reluctant  Labs reviewed     Her prescription meds were sent to [facility] , We called and verified on phone. ( Luann )

## 2024-05-14 NOTE — TELEPHONE ENCOUNTER
She should be taking only crestor, not simvastain  also .   ( Both are statins )  Will see her later today

## 2024-08-01 ENCOUNTER — APPOINTMENT (OUTPATIENT)
Dept: PRIMARY CARE | Facility: CLINIC | Age: 59
End: 2024-08-01
Payer: COMMERCIAL

## 2024-08-14 ENCOUNTER — APPOINTMENT (OUTPATIENT)
Dept: PRIMARY CARE | Facility: CLINIC | Age: 59
End: 2024-08-14
Payer: COMMERCIAL

## 2024-08-15 DIAGNOSIS — E78.00 PURE HYPERCHOLESTEROLEMIA: ICD-10-CM

## 2024-08-15 DIAGNOSIS — F41.9 ANXIETY AND DEPRESSION: ICD-10-CM

## 2024-08-15 DIAGNOSIS — J44.9 CHRONIC OBSTRUCTIVE PULMONARY DISEASE, UNSPECIFIED COPD TYPE (MULTI): ICD-10-CM

## 2024-08-15 DIAGNOSIS — F32.A ANXIETY AND DEPRESSION: ICD-10-CM

## 2024-08-15 DIAGNOSIS — E03.9 ADULT HYPOTHYROIDISM: ICD-10-CM

## 2024-08-15 RX ORDER — ALBUTEROL SULFATE 90 UG/1
2 INHALANT RESPIRATORY (INHALATION) EVERY 4 HOURS PRN
Qty: 18 G | Refills: 0 | Status: SHIPPED | OUTPATIENT
Start: 2024-08-15 | End: 2024-08-26 | Stop reason: SDUPTHER

## 2024-08-15 RX ORDER — ROSUVASTATIN CALCIUM 20 MG/1
20 TABLET, COATED ORAL DAILY
Qty: 90 TABLET | Refills: 0 | Status: SHIPPED | OUTPATIENT
Start: 2024-08-15 | End: 2024-08-26 | Stop reason: SDUPTHER

## 2024-08-15 RX ORDER — LEVOTHYROXINE SODIUM 150 UG/1
150 TABLET ORAL DAILY
Qty: 90 TABLET | Refills: 0 | Status: SHIPPED | OUTPATIENT
Start: 2024-08-15

## 2024-08-15 RX ORDER — HYDROXYZINE PAMOATE 25 MG/1
CAPSULE ORAL
Qty: 90 CAPSULE | Refills: 0 | Status: SHIPPED | OUTPATIENT
Start: 2024-08-15

## 2024-08-15 NOTE — TELEPHONE ENCOUNTER
She is no show on 8/01 and 8/14 !  Last seen in May , so ok to refill, but next time she must be seen in office

## 2024-08-26 DIAGNOSIS — E78.00 PURE HYPERCHOLESTEROLEMIA: ICD-10-CM

## 2024-08-26 DIAGNOSIS — J44.9 CHRONIC OBSTRUCTIVE PULMONARY DISEASE, UNSPECIFIED COPD TYPE (MULTI): ICD-10-CM

## 2024-08-26 RX ORDER — ALBUTEROL SULFATE 90 UG/1
2 INHALANT RESPIRATORY (INHALATION) EVERY 4 HOURS PRN
Qty: 18 G | Refills: 3 | Status: SHIPPED | OUTPATIENT
Start: 2024-08-26

## 2024-08-26 RX ORDER — ROSUVASTATIN CALCIUM 20 MG/1
20 TABLET, COATED ORAL DAILY
Qty: 90 TABLET | Refills: 1 | Status: SHIPPED | OUTPATIENT
Start: 2024-08-26

## 2024-08-30 NOTE — TELEPHONE ENCOUNTER
SHE HAS MANY NO SHOWS AND CANCELLATIONS  Pl. Send letter of dismissal , so she can find a new Dr suitable to her needs , Thanks

## 2024-09-10 ENCOUNTER — TELEPHONE (OUTPATIENT)
Dept: PRIMARY CARE | Facility: CLINIC | Age: 59
End: 2024-09-10
Payer: COMMERCIAL

## 2024-09-10 NOTE — TELEPHONE ENCOUNTER
MD Kamala Rosado; Kamala Bhatti  SHE HAS MANY NO SHOWS AND CANCELLATIONS  Pl. Send letter of dismissal , so she can find a new Dr suitable to her needs , Thanks

## 2025-10-21 ENCOUNTER — APPOINTMENT (OUTPATIENT)
Dept: OTOLARYNGOLOGY | Facility: CLINIC | Age: 60
End: 2025-10-21
Payer: COMMERCIAL